# Patient Record
Sex: MALE | Race: WHITE | NOT HISPANIC OR LATINO | Employment: FULL TIME | URBAN - NONMETROPOLITAN AREA
[De-identification: names, ages, dates, MRNs, and addresses within clinical notes are randomized per-mention and may not be internally consistent; named-entity substitution may affect disease eponyms.]

---

## 2022-05-03 ENCOUNTER — OFFICE VISIT (OUTPATIENT)
Dept: FAMILY MEDICINE CLINIC | Facility: CLINIC | Age: 22
End: 2022-05-03

## 2022-05-03 VITALS
HEART RATE: 90 BPM | BODY MASS INDEX: 26.49 KG/M2 | HEIGHT: 73 IN | DIASTOLIC BLOOD PRESSURE: 76 MMHG | RESPIRATION RATE: 15 BRPM | TEMPERATURE: 98.7 F | SYSTOLIC BLOOD PRESSURE: 120 MMHG | OXYGEN SATURATION: 99 % | WEIGHT: 199.9 LBS

## 2022-05-03 DIAGNOSIS — J02.0 STREP PHARYNGITIS: Primary | ICD-10-CM

## 2022-05-03 LAB
EXPIRATION DATE: ABNORMAL
INTERNAL CONTROL: ABNORMAL
Lab: ABNORMAL
S PYO AG THROAT QL: POSITIVE

## 2022-05-03 PROCEDURE — 99213 OFFICE O/P EST LOW 20 MIN: CPT | Performed by: PHYSICIAN ASSISTANT

## 2022-05-03 PROCEDURE — 87880 STREP A ASSAY W/OPTIC: CPT | Performed by: PHYSICIAN ASSISTANT

## 2022-05-03 RX ORDER — CEPHALEXIN 500 MG/1
1000 CAPSULE ORAL 2 TIMES DAILY
Qty: 40 CAPSULE | Refills: 0 | Status: SHIPPED | OUTPATIENT
Start: 2022-05-03 | End: 2022-10-15

## 2022-05-03 NOTE — PROGRESS NOTES
"      Patient Office Visit      Patient Name: Evaristo King  : 2000   MRN: [id number]     Chief Complaint:    Chief Complaint   Patient presents with   • Sore Throat     X 3 days       History of Present Illness: Evaristo King is a 21 y.o. male who is here today for a very sore throat x3 days.  He denies any fever, chills, nasal drainage or cough.    Subjective      Review of Systems:   Review of Systems   Constitutional: Negative for chills and fever.   HENT: Positive for sore throat. Negative for congestion, postnasal drip, rhinorrhea and sneezing.    Respiratory: Negative for cough and shortness of breath.    Cardiovascular: Negative for chest pain.        Past Medical History: History reviewed. No pertinent past medical history.    Past Surgical History: History reviewed. No pertinent surgical history.    Family History: History reviewed. No pertinent family history.    Social History:   Social History     Socioeconomic History   • Marital status: Single   Tobacco Use   • Smoking status: Never Smoker   • Smokeless tobacco: Never Used   Vaping Use   • Vaping Use: Never used   Substance and Sexual Activity   • Alcohol use: Defer   • Drug use: Defer   • Sexual activity: Defer       Allergies:   No Known Allergies    Objective     Physical Exam:  Vital Signs:   Vitals:    22 1045   BP: 120/76   BP Location: Left arm   Patient Position: Sitting   Cuff Size: Adult   Pulse: 90   Resp: 15   Temp: 98.7 °F (37.1 °C)   TempSrc: Temporal   SpO2: 99%   Weight: 90.7 kg (199 lb 14.4 oz)   Height: 185.4 cm (73\")     Body mass index is 26.37 kg/m².          Physical Exam  Constitutional:       Appearance: Normal appearance.   HENT:      Right Ear: Tympanic membrane, ear canal and external ear normal.      Left Ear: Tympanic membrane, ear canal and external ear normal.      Nose: No congestion or rhinorrhea.      Mouth/Throat:      Pharynx: Oropharyngeal exudate and posterior oropharyngeal erythema present. "   Pulmonary:      Breath sounds: No rales.   Lymphadenopathy:      Cervical: Cervical adenopathy present.   Neurological:      Mental Status: He is alert.         Procedures    Assessment / Plan      Assessment/Plan:   Diagnoses and all orders for this visit:    1. Strep pharyngitis (Primary)  -     POC Rapid Strep A  -     cephalexin (Keflex) 500 MG capsule; Take 2 capsules by mouth 2 (Two) Times a Day.  Dispense: 40 capsule; Refill: 0    Positive rapid strep.  Prescribed Keflex to take 1000 mg twice daily x10 days.  Instructed patient to take all of his antibiotic.    Medications:     Current Outpatient Medications:   •  cephalexin (Keflex) 500 MG capsule, Take 2 capsules by mouth 2 (Two) Times a Day., Disp: 40 capsule, Rfl: 0        Follow Up:   Return if symptoms worsen or fail to improve.    Dayanara Jarquin PA-C   [facility]

## 2022-10-15 ENCOUNTER — OFFICE VISIT (OUTPATIENT)
Dept: FAMILY MEDICINE CLINIC | Facility: CLINIC | Age: 22
End: 2022-10-15

## 2022-10-15 VITALS — OXYGEN SATURATION: 98 % | HEART RATE: 77 BPM

## 2022-10-15 DIAGNOSIS — B34.9 VIRAL ILLNESS: ICD-10-CM

## 2022-10-15 DIAGNOSIS — R19.7 DIARRHEA OF PRESUMED INFECTIOUS ORIGIN: ICD-10-CM

## 2022-10-15 DIAGNOSIS — J40 BRONCHITIS: Primary | ICD-10-CM

## 2022-10-15 LAB
EXPIRATION DATE: NORMAL
FLUAV AG UPPER RESP QL IA.RAPID: NOT DETECTED
FLUBV AG UPPER RESP QL IA.RAPID: NOT DETECTED
INTERNAL CONTROL: NORMAL
Lab: NORMAL
SARS-COV-2 AG UPPER RESP QL IA.RAPID: NOT DETECTED

## 2022-10-15 PROCEDURE — 99214 OFFICE O/P EST MOD 30 MIN: CPT | Performed by: FAMILY MEDICINE

## 2022-10-15 PROCEDURE — 87428 SARSCOV & INF VIR A&B AG IA: CPT | Performed by: FAMILY MEDICINE

## 2022-10-15 RX ORDER — AMOXICILLIN 500 MG/1
1000 CAPSULE ORAL 2 TIMES DAILY
Qty: 14 CAPSULE | Refills: 0 | Status: SHIPPED | OUTPATIENT
Start: 2022-10-15 | End: 2023-02-14

## 2022-10-15 NOTE — PROGRESS NOTES
Telehealth E-Visit      Date: 10/15/2022   Patient Name: Evaristo King  : 2000   MRN: [id number]     Chief Complaint:    Chief Complaint   Patient presents with   • Diarrhea   • Sore Throat   • Nasal Congestion     ONSET 2 DAY      Telehealth Visit completed via MY CHART for video conferencing, patient here for telehealth visit. Patient understands the limitations of the visit with telehealth given limitations in the exam findings but patient is agreeable to this telemedicine visit due to concerns with COVID 19 exposure if patient were to be present at the office at this time    Patient location: [facility]  Provider location: [facility]  Patient has chosen to receive care through telemedicine the patient does give consent to use video audio for medical care today.    I have reviewed the E-Visit questionnaire and the patient's answers, my assessment and plan are listed below.     History of Present Illness: Evaristo King is a 21 y.o. male who is here stating that he has had 3 days of congestion drainage cough he recently took his daughter to the hospital when she was diagnosed with a bronchitis similar upper respiratory infection.  Over the last day he is also developed watery diarrhea.  No abdominal pain no nausea vomiting.  No fevers chills.  Has been treating conservatively over-the-counter.      Subjective      Review of Systems:   Review of Systems   Constitutional: Negative.    HENT: Positive for postnasal drip, rhinorrhea, sinus pressure, sneezing and sore throat.    Eyes: Negative.    Respiratory: Negative.    Cardiovascular: Negative.    Gastrointestinal: Positive for diarrhea.   Endocrine: Negative.    Genitourinary: Negative.    Musculoskeletal: Negative.    Skin: Negative.    Allergic/Immunologic: Negative.    Neurological: Negative.    Hematological: Negative.    Psychiatric/Behavioral: Negative.        I have reviewed and the following portions of  the patient's history were updated as appropriate: past family history, past medical history, past social history, past surgical history and problem list.    Medications:     Current Outpatient Medications:   •  amoxicillin (AMOXIL) 500 MG capsule, Take 2 capsules by mouth 2 (Two) Times a Day., Disp: 14 capsule, Rfl: 0    Allergies:   Allergies   Allergen Reactions   • Acorus Other (See Comments)     Wheezing   • Other Hives     Outdoors, fresh cut grass       Objective     Physical Exam:  Vital Signs:   Vitals:    10/15/22 1103   Pulse: 77   SpO2: 98%     There is no height or weight on file to calculate BMI.    Physical Exam  Vitals reviewed: Exam is done and limited to telemedicine due to COVID.           Assessment / Plan      Assessment/Plan:   Diagnoses and all orders for this visit:    1. Bronchitis (Primary)  Negative rapid COVID and flu test.  For now patient states that since his daughter had similar symptoms and she had acute bronchitis he is concerned especially with it being the weekend would like to see if we can go ahead and call in an antibiotic for him to keep.  We will go ahead and do oral amoxicillin for suspected possible bronchitis I have recommended to hold off on this medication as at this point it sounds like it is still likely viral and with his diarrhea related symptoms that it could make GI symptoms worse he voices full understanding    2. Viral illness  As above    3.  Diarrhea of presumed infectious origin  Suspect infectious viral diarrhea stay hydrated of course of any severe abdominal pain go to the ER    MEDICATION SIDE EFFECTS, RISKS AND SIDE EFFECTS HAVE BEEN DISCUSSED WITH PATIENT. PATIENT NOTES UNDERSTANDING. IF ANY CONCERN OR QUESTION REGARDING MEDICATION PLEASE CONTACT.     Follow Up:   No follow-ups on file.    MEDICATION SIDE EFFECTS, RISKS AND SIDE EFFECTS HAVE BEEN DISCUSSED WITH PATIENT. PATIENT NOTES UNDERSTANDING. IF ANY CONCERN OR QUESTION REGARDING MEDICATION PLEASE  CONTACT.     35 minutes were spent reviewing the patient's questionnaire, formulating a treatment plan, and relaying information to the patient via MyChart.    Annia Casillas DO  [facility]   10/15/22  11:04 EDT

## 2022-12-08 ENCOUNTER — TELEMEDICINE (OUTPATIENT)
Dept: FAMILY MEDICINE CLINIC | Facility: CLINIC | Age: 22
End: 2022-12-08

## 2022-12-08 DIAGNOSIS — Z23 ENCOUNTER FOR IMMUNIZATION: Primary | ICD-10-CM

## 2022-12-10 PROBLEM — Z23 ENCOUNTER FOR IMMUNIZATION: Status: ACTIVE | Noted: 2022-12-10

## 2022-12-10 NOTE — PROGRESS NOTES
"Chief Complaint  Immunizations (/)    Subjective        Evaristo King presents to [facility]  History of Present Illness  Patient did not need tetanus shot so visit was canceled immunizations up-to-date      Objective   Vital Signs:  There were no vitals taken for this visit.  Estimated body mass index is 26.37 kg/m² as calculated from the following:    Height as of 5/3/22: 185.4 cm (73\").    Weight as of 5/3/22: 90.7 kg (199 lb 14.4 oz).          Physical Exam   Result Review :                Assessment and Plan   Diagnoses and all orders for this visit:    1. Encounter for immunization (Primary)             Follow Up   No follow-ups on file.  Patient was given instructions and counseling regarding his condition or for health maintenance advice. Please see specific information pulled into the AVS if appropriate.       "

## 2023-02-14 ENCOUNTER — OFFICE VISIT (OUTPATIENT)
Dept: FAMILY MEDICINE CLINIC | Facility: CLINIC | Age: 23
End: 2023-02-14
Payer: COMMERCIAL

## 2023-02-14 VITALS
DIASTOLIC BLOOD PRESSURE: 80 MMHG | TEMPERATURE: 97.8 F | HEIGHT: 72 IN | HEART RATE: 88 BPM | OXYGEN SATURATION: 98 % | SYSTOLIC BLOOD PRESSURE: 122 MMHG | BODY MASS INDEX: 27.24 KG/M2 | WEIGHT: 201.14 LBS

## 2023-02-14 DIAGNOSIS — K52.9 GASTROENTERITIS: Primary | ICD-10-CM

## 2023-02-14 PROCEDURE — 99213 OFFICE O/P EST LOW 20 MIN: CPT | Performed by: FAMILY MEDICINE

## 2023-02-14 RX ORDER — ONDANSETRON 8 MG/1
8 TABLET, ORALLY DISINTEGRATING ORAL EVERY 8 HOURS PRN
Qty: 30 TABLET | Refills: 0 | Status: SHIPPED | OUTPATIENT
Start: 2023-02-14

## 2023-02-14 RX ORDER — OMEPRAZOLE 40 MG/1
40 CAPSULE, DELAYED RELEASE ORAL DAILY
Qty: 30 CAPSULE | Refills: 0 | Status: SHIPPED | OUTPATIENT
Start: 2023-02-14

## 2023-02-14 NOTE — ASSESSMENT & PLAN NOTE
This is resolving and self-limiting.  I do suspect he has some gastritis from recent GI illness so he will start a 2-week course of omeprazole 40 mg daily.  He was also instructed to obtain an over-the-counter probiotic and begin that for the next 4 weeks.  I did send in some Zofran as well in case he should have more nausea or vomiting.  He will call if symptoms are persistent or worsening

## 2023-02-14 NOTE — PROGRESS NOTES
Date: 2023   Patient Name: Evaristo King  : 2000   MRN: [id number]     Chief Complaint:    Chief Complaint   Patient presents with   • Abdominal Pain     Abdominal pain since Saturday, reports a burning sensation, nausea, vomiting, diarrhea        History of Present Illness: Evaristo King is a 22 y.o. male who is here today for Abdominal pain.  He reports he started having nausea, vomiting, and diarrhea on Saturday that was sudden onset.  Symptoms have improved over the last 48 hours and his last episode of vomiting or diarrhea was yesterday evening after he ate a greasy meal.  He states he is still having a burning sensation in the epigastric area of his stomach and still feeling nauseous at times.  He has not eaten today but has been drinking Gatorade and water which she has tolerated well.  He has not been around anyone that has been sick that he knows of but he was in the ED with his girlfriend's daughter late last week.  He denies blood in the stool.           Review of Systems:   Review of Systems   Constitutional: Positive for appetite change and chills. Negative for fatigue and fever.   Eyes: Negative for blurred vision.   Respiratory: Negative for cough, chest tightness and shortness of breath.    Cardiovascular: Negative for chest pain, palpitations and leg swelling.   Gastrointestinal: Positive for diarrhea, nausea, vomiting and GERD. Negative for abdominal pain and constipation.   Neurological: Negative for dizziness, tremors and headache.   Psychiatric/Behavioral: Negative for depressed mood. The patient is not nervous/anxious.        Past Medical History: History reviewed. No pertinent past medical history.    Past Surgical History: History reviewed. No pertinent surgical history.    Family History:   Family History   Problem Relation Age of Onset   • Polycystic kidney disease Maternal Grandfather        Social History:   Social History     Socioeconomic History   • Marital  "status: Single   Tobacco Use   • Smoking status: Never   • Smokeless tobacco: Never   Vaping Use   • Vaping Use: Never used   Substance and Sexual Activity   • Alcohol use: Defer   • Drug use: Defer   • Sexual activity: Defer       Medications:     Current Outpatient Medications:   •  omeprazole (priLOSEC) 40 MG capsule, Take 1 capsule by mouth Daily., Disp: 30 capsule, Rfl: 0  •  ondansetron ODT (ZOFRAN-ODT) 8 MG disintegrating tablet, Place 1 tablet on the tongue Every 8 (Eight) Hours As Needed for Nausea or Vomiting., Disp: 30 tablet, Rfl: 0    Allergies:   Allergies   Allergen Reactions   • Acorus Other (See Comments)     Wheezing   • Other Hives     Outdoors, fresh cut grass         Physical Exam:  Vital Signs:   Vitals:    02/14/23 1413   BP: 122/80   BP Location: Right arm   Patient Position: Sitting   Cuff Size: Adult   Pulse: 88   Temp: 97.8 °F (36.6 °C)   TempSrc: Temporal   SpO2: 98%   Weight: 91.2 kg (201 lb 2.2 oz)   Height: 182.9 cm (72\")     Body mass index is 27.28 kg/m².     Physical Exam  Vitals and nursing note reviewed.   Constitutional:       Appearance: Normal appearance.   Cardiovascular:      Rate and Rhythm: Normal rate and regular rhythm.      Heart sounds: Normal heart sounds. No murmur heard.  Pulmonary:      Effort: Pulmonary effort is normal.      Breath sounds: Normal breath sounds. No wheezing.   Abdominal:      General: Bowel sounds are normal.      Palpations: Abdomen is soft.      Comments: Mild tenderness to palpation in the epigastric region   Neurological:      Mental Status: He is alert and oriented to person, place, and time. Mental status is at baseline.   Psychiatric:         Mood and Affect: Mood normal.         Behavior: Behavior normal.           Assessment/Plan:   Diagnoses and all orders for this visit:    1. Gastroenteritis (Primary)  Assessment & Plan:  This is resolving and self-limiting.  I do suspect he has some gastritis from recent GI illness so he will start a " 2-week course of omeprazole 40 mg daily.  He was also instructed to obtain an over-the-counter probiotic and begin that for the next 4 weeks.  I did send in some Zofran as well in case he should have more nausea or vomiting.  He will call if symptoms are persistent or worsening    Orders:  -     omeprazole (priLOSEC) 40 MG capsule; Take 1 capsule by mouth Daily.  Dispense: 30 capsule; Refill: 0  -     ondansetron ODT (ZOFRAN-ODT) 8 MG disintegrating tablet; Place 1 tablet on the tongue Every 8 (Eight) Hours As Needed for Nausea or Vomiting.  Dispense: 30 tablet; Refill: 0         Follow Up:   Return if symptoms worsen or fail to improve.    Laura Montes, DO  [facility]

## 2023-04-18 ENCOUNTER — OFFICE VISIT (OUTPATIENT)
Dept: FAMILY MEDICINE CLINIC | Facility: CLINIC | Age: 23
End: 2023-04-18
Payer: COMMERCIAL

## 2023-04-18 VITALS
WEIGHT: 199.8 LBS | OXYGEN SATURATION: 97 % | HEART RATE: 88 BPM | SYSTOLIC BLOOD PRESSURE: 136 MMHG | BODY MASS INDEX: 27.06 KG/M2 | HEIGHT: 72 IN | DIASTOLIC BLOOD PRESSURE: 70 MMHG

## 2023-04-18 DIAGNOSIS — K52.9 GASTROENTERITIS: Primary | ICD-10-CM

## 2023-04-18 DIAGNOSIS — S39.011A STRAIN OF ABDOMINAL MUSCLE, INITIAL ENCOUNTER: ICD-10-CM

## 2023-04-18 PROBLEM — R10.30 LOWER ABDOMINAL PAIN: Status: RESOLVED | Noted: 2023-04-18 | Resolved: 2023-04-18

## 2023-04-18 PROBLEM — R10.30 LOWER ABDOMINAL PAIN: Status: ACTIVE | Noted: 2023-04-18

## 2023-04-18 RX ORDER — OMEPRAZOLE 40 MG/1
40 CAPSULE, DELAYED RELEASE ORAL DAILY
Qty: 30 CAPSULE | Refills: 3 | Status: SHIPPED | OUTPATIENT
Start: 2023-04-18

## 2023-04-18 NOTE — ASSESSMENT & PLAN NOTE
Advised patient this is likely secondary to vomiting.  Recommended heating pad, rest and light stretching.

## 2023-04-18 NOTE — ASSESSMENT & PLAN NOTE
Advised patient to start a bland diet, restart omeprazole and rest.  Increase fluids as tolerated.

## 2023-04-18 NOTE — PROGRESS NOTES
"Chief Complaint  Abdominal Pain    Subjective        Evaristo King presents to [facility]  History of Present Illness  Patient reports today secondary to having nausea, vomiting 4 days ago after drinking alcohol.  Patient states he vomited \"a lot\" meaning numerous times and had abdominal and bilateral side pain afterwards.  Patient states the next day he did have some nausea however no vomiting.  Patient reports that yesterday he lifted a heavy item at the store and had some lower abdominal pain.  Patient reports for dinner he had fried chicken and hot potato chips states he vomited and had nausea.  Patient reports no fever or chills.  Patient denies any blood in his vomit denies any diarrhea.  Patient reports he is feeling better today however has some discomfort to his sides with certain movements  Abdominal Pain        Objective   Vital Signs:  /70 (BP Location: Right arm, Patient Position: Sitting, Cuff Size: Adult)   Pulse 88   Ht 182.9 cm (72\")   Wt 90.6 kg (199 lb 12.8 oz)   SpO2 97%   BMI 27.10 kg/m²   Estimated body mass index is 27.1 kg/m² as calculated from the following:    Height as of this encounter: 182.9 cm (72\").    Weight as of this encounter: 90.6 kg (199 lb 12.8 oz).             Physical Exam  Vitals and nursing note reviewed.   Constitutional:       General: He is not in acute distress.     Appearance: He is not ill-appearing.   Cardiovascular:      Rate and Rhythm: Normal rate and regular rhythm.      Pulses: Normal pulses.   Pulmonary:      Effort: Pulmonary effort is normal. No respiratory distress.   Abdominal:      General: Bowel sounds are increased. There is no distension.      Palpations: Abdomen is soft.      Tenderness: There is abdominal tenderness. There is no guarding or rebound.      Comments: Patient mild tenderness upon palpation to bilateral mid axillary line around to lower left and lower right quadrant of abdomen   Musculoskeletal:   "       General: Normal range of motion.   Skin:     General: Skin is warm and dry.   Psychiatric:         Mood and Affect: Mood normal.        Result Review :                   Assessment and Plan   Diagnoses and all orders for this visit:    1. Gastroenteritis (Primary)  Assessment & Plan:  Advised patient to start a bland diet, restart omeprazole and rest.  Increase fluids as tolerated.      Orders:  -     omeprazole (priLOSEC) 40 MG capsule; Take 1 capsule by mouth Daily.  Dispense: 30 capsule; Refill: 3    2. Strain of abdominal muscle, initial encounter  Assessment & Plan:  Advised patient this is likely secondary to vomiting.  Recommended heating pad, rest and light stretching.             Follow Up   Return if symptoms worsen or fail to improve.  Patient was given instructions and counseling regarding his condition or for health maintenance advice. Please see specific information pulled into the AVS if appropriate.

## 2023-04-18 NOTE — LETTER
April 18, 2023     Patient: Evaristo King   YOB: 2000   Date of Visit: 4/18/2023       To Whom It May Concern:    It is my medical opinion that Evaristo King may return to work in two days.         Sincerely,        Melissa Sears PA-C    CC: No Recipients

## 2023-07-25 ENCOUNTER — OFFICE VISIT (OUTPATIENT)
Dept: FAMILY MEDICINE CLINIC | Facility: CLINIC | Age: 23
End: 2023-07-25
Payer: COMMERCIAL

## 2023-07-25 VITALS
DIASTOLIC BLOOD PRESSURE: 80 MMHG | BODY MASS INDEX: 28.34 KG/M2 | SYSTOLIC BLOOD PRESSURE: 120 MMHG | HEIGHT: 73 IN | OXYGEN SATURATION: 99 % | WEIGHT: 213.8 LBS | HEART RATE: 84 BPM

## 2023-07-25 DIAGNOSIS — K21.9 GASTROESOPHAGEAL REFLUX DISEASE, UNSPECIFIED WHETHER ESOPHAGITIS PRESENT: ICD-10-CM

## 2023-07-25 DIAGNOSIS — F41.1 GENERALIZED ANXIETY DISORDER: ICD-10-CM

## 2023-07-25 DIAGNOSIS — E04.9 GOITER: Primary | ICD-10-CM

## 2023-07-25 PROCEDURE — 99214 OFFICE O/P EST MOD 30 MIN: CPT | Performed by: PHYSICIAN ASSISTANT

## 2023-07-25 RX ORDER — HYDROXYZINE HYDROCHLORIDE 25 MG/1
TABLET, FILM COATED ORAL
Qty: 90 TABLET | Refills: 1 | Status: SHIPPED | OUTPATIENT
Start: 2023-07-25

## 2023-07-25 RX ORDER — OMEPRAZOLE 40 MG/1
40 CAPSULE, DELAYED RELEASE ORAL DAILY
Qty: 30 CAPSULE | Refills: 3 | Status: SHIPPED | OUTPATIENT
Start: 2023-07-25

## 2023-07-25 NOTE — PROGRESS NOTES
"Chief Complaint  knot in throat (X4 days)    Subjective          Evaristo King presents to [facility]  History of Present Illness patient reports that he has a sensation that he has a \"knot in his throat\" for the past 4 days.  He says it is uncomfortable when he swallows but he's not choking or having trouble swallowing, and he feels like maybe he is not getting enough air in his lungs but he does have a history of anxiety especially when he thinks there is something medically wrong and there is some question as to whether or not some of his symptoms are related to his anxiety.  He previously did have some GERD symptoms and was put on omeprazole but he stopped taking it when those symptoms improved.  Is not had sore throat and has not had a lot of drainage or postnasal drip.     Objective   Vital Signs:   /80 (BP Location: Right arm, Patient Position: Sitting, Cuff Size: Large Adult)   Pulse 84   Ht 185.4 cm (73\")   Wt 97 kg (213 lb 12.8 oz)   SpO2 99%   BMI 28.21 kg/m²     Body mass index is 28.21 kg/m².    Review of Systems   Constitutional:  Negative for appetite change, fatigue, unexpected weight gain and unexpected weight loss.   HENT:  Negative for congestion, postnasal drip, sore throat, swollen glands and trouble swallowing.    Eyes:  Negative for blurred vision.   Respiratory:  Negative for cough, chest tightness and shortness of breath.    Cardiovascular:  Negative for chest pain, palpitations and leg swelling.   Gastrointestinal:  Negative for abdominal pain, constipation, diarrhea, nausea and vomiting.   Neurological:  Negative for dizziness, tremors and headache.   Psychiatric/Behavioral:  Negative for depressed mood. The patient is not nervous/anxious.      Past History:  Medical History: has no past medical history on file.   Surgical History: has no past surgical history on file.   Family History: family history includes Asthma in his mother; Polycystic " kidney disease in his maternal grandfather.   Social History: reports that he has never smoked. He has never used smokeless tobacco. He reports that he does not currently use alcohol. He reports that he does not use drugs.      Current Outpatient Medications:     omeprazole (priLOSEC) 40 MG capsule, Take 1 capsule by mouth Daily., Disp: 30 capsule, Rfl: 3    hydrOXYzine (ATARAX) 25 MG tablet, Take 1 or 2 as needed for anxiety, Disp: 90 tablet, Rfl: 1    Allergies: Acorus and Other    Physical Exam  HENT:      Nose: Nose normal.      Mouth/Throat:      Mouth: Mucous membranes are moist.   Neck:      Thyroid: Thyromegaly present. No thyroid mass or thyroid tenderness.      Comments: Diffuse goiter  Cardiovascular:      Rate and Rhythm: Normal rate and regular rhythm.      Heart sounds: Normal heart sounds.   Pulmonary:      Effort: Pulmonary effort is normal. No respiratory distress.      Breath sounds: Normal breath sounds. No wheezing or rhonchi.   Musculoskeletal:      Cervical back: Normal range of motion and neck supple. No rigidity or tenderness. No pain with movement. Normal range of motion.   Lymphadenopathy:      Cervical: No cervical adenopathy.   Neurological:      General: No focal deficit present.      Mental Status: He is alert and oriented to person, place, and time.   Psychiatric:         Mood and Affect: Mood normal.         Behavior: Behavior normal.        Result Review :                   Assessment and Plan    Diagnoses and all orders for this visit:    1. Goiter (Primary)  Assessment & Plan:  I am going to get a thyroid ultrasound to look at this further.  I will also get some routine screening blood work done and have him follow-up in a couple of weeks to discuss the results.  Reassured him and told him I did not think there is anything serious going on here but these test should give us some answers.    Orders:  -     TSH; Future  -     T4, free; Future  -     US Thyroid; Future  -     TSH  -      T4, free    2. Gastroesophageal reflux disease, unspecified whether esophagitis present  Assessment & Plan:  I suggested we resume the Omeprazole as this sensation he feels could be related to reflux.     Orders:  -     omeprazole (priLOSEC) 40 MG capsule; Take 1 capsule by mouth Daily.  Dispense: 30 capsule; Refill: 3  -     CBC & Differential; Future  -     Comprehensive Metabolic Panel; Future  -     CBC & Differential  -     Comprehensive Metabolic Panel    3. Generalized anxiety disorder  Assessment & Plan:  I have suggested that we give him some hydroxyzine to take if he has panic feelings between now and when we follow-up.      Other orders  -     hydrOXYzine (ATARAX) 25 MG tablet; Take 1 or 2 as needed for anxiety  Dispense: 90 tablet; Refill: 1        Follow Up   Return in about 2 weeks (around 8/8/2023) for Recheck.  Patient was given instructions and counseling regarding his condition or for health maintenance advice. Please see specific information pulled into the AVS if appropriate.     Amee Bradshaw PA-C

## 2023-07-25 NOTE — ASSESSMENT & PLAN NOTE
I have suggested that we give him some hydroxyzine to take if he has panic feelings between now and when we follow-up.

## 2023-07-25 NOTE — ASSESSMENT & PLAN NOTE
I am going to get a thyroid ultrasound to look at this further.  I will also get some routine screening blood work done and have him follow-up in a couple of weeks to discuss the results.  Reassured him and told him I did not think there is anything serious going on here but these test should give us some answers.

## 2023-07-26 LAB
ALBUMIN SERPL-MCNC: 5 G/DL (ref 4.3–5.2)
ALBUMIN/GLOB SERPL: 2.2 {RATIO} (ref 1.2–2.2)
ALP SERPL-CCNC: 83 IU/L (ref 44–121)
ALT SERPL-CCNC: 25 IU/L (ref 0–44)
AST SERPL-CCNC: 21 IU/L (ref 0–40)
BASOPHILS # BLD AUTO: 0 X10E3/UL (ref 0–0.2)
BASOPHILS NFR BLD AUTO: 1 %
BILIRUB SERPL-MCNC: 0.6 MG/DL (ref 0–1.2)
BUN SERPL-MCNC: 12 MG/DL (ref 6–20)
BUN/CREAT SERPL: 11 (ref 9–20)
CALCIUM SERPL-MCNC: 9.9 MG/DL (ref 8.7–10.2)
CHLORIDE SERPL-SCNC: 103 MMOL/L (ref 96–106)
CO2 SERPL-SCNC: 23 MMOL/L (ref 20–29)
CREAT SERPL-MCNC: 1.12 MG/DL (ref 0.76–1.27)
EGFRCR SERPLBLD CKD-EPI 2021: 95 ML/MIN/1.73
EOSINOPHIL # BLD AUTO: 0.3 X10E3/UL (ref 0–0.4)
EOSINOPHIL NFR BLD AUTO: 4 %
ERYTHROCYTE [DISTWIDTH] IN BLOOD BY AUTOMATED COUNT: 12.4 % (ref 11.6–15.4)
GLOBULIN SER CALC-MCNC: 2.3 G/DL (ref 1.5–4.5)
GLUCOSE SERPL-MCNC: 92 MG/DL (ref 70–99)
HCT VFR BLD AUTO: 48 % (ref 37.5–51)
HGB BLD-MCNC: 15.7 G/DL (ref 13–17.7)
IMM GRANULOCYTES # BLD AUTO: 0 X10E3/UL (ref 0–0.1)
IMM GRANULOCYTES NFR BLD AUTO: 0 %
LYMPHOCYTES # BLD AUTO: 1.8 X10E3/UL (ref 0.7–3.1)
LYMPHOCYTES NFR BLD AUTO: 26 %
MCH RBC QN AUTO: 28.1 PG (ref 26.6–33)
MCHC RBC AUTO-ENTMCNC: 32.7 G/DL (ref 31.5–35.7)
MCV RBC AUTO: 86 FL (ref 79–97)
MONOCYTES # BLD AUTO: 0.7 X10E3/UL (ref 0.1–0.9)
MONOCYTES NFR BLD AUTO: 10 %
NEUTROPHILS # BLD AUTO: 4 X10E3/UL (ref 1.4–7)
NEUTROPHILS NFR BLD AUTO: 59 %
PLATELET # BLD AUTO: 237 X10E3/UL (ref 150–450)
POTASSIUM SERPL-SCNC: 4.4 MMOL/L (ref 3.5–5.2)
PROT SERPL-MCNC: 7.3 G/DL (ref 6–8.5)
RBC # BLD AUTO: 5.58 X10E6/UL (ref 4.14–5.8)
SODIUM SERPL-SCNC: 142 MMOL/L (ref 134–144)
T4 FREE SERPL-MCNC: 1.5 NG/DL (ref 0.82–1.77)
TSH SERPL DL<=0.005 MIU/L-ACNC: 1.01 UIU/ML (ref 0.45–4.5)
WBC # BLD AUTO: 6.7 X10E3/UL (ref 3.4–10.8)

## 2023-08-09 ENCOUNTER — OFFICE VISIT (OUTPATIENT)
Dept: FAMILY MEDICINE CLINIC | Facility: CLINIC | Age: 23
End: 2023-08-09
Payer: COMMERCIAL

## 2023-08-09 VITALS
SYSTOLIC BLOOD PRESSURE: 123 MMHG | HEIGHT: 73 IN | WEIGHT: 216.8 LBS | DIASTOLIC BLOOD PRESSURE: 70 MMHG | HEART RATE: 101 BPM | BODY MASS INDEX: 28.73 KG/M2 | TEMPERATURE: 98 F | OXYGEN SATURATION: 98 %

## 2023-08-09 DIAGNOSIS — K21.9 GASTROESOPHAGEAL REFLUX DISEASE, UNSPECIFIED WHETHER ESOPHAGITIS PRESENT: Primary | ICD-10-CM

## 2023-08-09 PROCEDURE — 99213 OFFICE O/P EST LOW 20 MIN: CPT | Performed by: FAMILY MEDICINE

## 2023-08-09 NOTE — PROGRESS NOTES
Date: 2023   Patient Name: Evaristo King  : 2000   MRN: [id number]     Chief Complaint:    Chief Complaint   Patient presents with    Results     Discuss thyroid ultrasound results        History of Present Illness: Evaristo King is a 22 y.o. male who is here today to follow up for Recent thyroid ultrasound.  He saw another provider complaining of feeling like a lump in his throat.  Thyroid ultrasound is normal.  He reports his symptoms have improved since restarting his omeprazole however have not completely resolved.           Review of Systems:   Review of Systems   Constitutional:  Negative for fatigue.   Eyes:  Negative for blurred vision.   Respiratory:  Negative for cough, chest tightness and shortness of breath.    Cardiovascular:  Negative for chest pain, palpitations and leg swelling.   Gastrointestinal:  Positive for GERD and indigestion. Negative for abdominal pain, constipation, diarrhea, nausea and vomiting.   Neurological:  Negative for dizziness, tremors and headache.   Psychiatric/Behavioral:  Negative for depressed mood. The patient is not nervous/anxious.      Past Medical History: History reviewed. No pertinent past medical history.    Past Surgical History: History reviewed. No pertinent surgical history.    Family History:   Family History   Problem Relation Age of Onset    Asthma Mother     Polycystic kidney disease Maternal Grandfather        Social History:   Social History     Socioeconomic History    Marital status: Single   Tobacco Use    Smoking status: Never    Smokeless tobacco: Never   Vaping Use    Vaping Use: Every day    Substances: Nicotine, Flavoring    Devices: Disposable, Pre-filled pod   Substance and Sexual Activity    Alcohol use: Not Currently    Drug use: Never    Sexual activity: Defer       Medications:     Current Outpatient Medications:     hydrOXYzine (ATARAX) 25 MG tablet, Take 1 or 2 as needed for anxiety, Disp: 90 tablet, Rfl: 1     "omeprazole (priLOSEC) 40 MG capsule, Take 1 capsule by mouth Daily., Disp: 30 capsule, Rfl: 3    Allergies:   Allergies   Allergen Reactions    Acorus Other (See Comments)     Wheezing    Other Hives     Outdoors, fresh cut grass       PHQ-2 Total Score:     PHQ-9 Total Score:       Physical Exam:  Vital Signs:   Vitals:    08/09/23 1334 08/09/23 1350   BP: 158/90 123/70   BP Location: Right arm    Patient Position: Sitting    Cuff Size: Adult    Pulse: 101    Temp: 98 øF (36.7 øC)    TempSrc: Temporal    SpO2: 98%    Weight: 98.3 kg (216 lb 12.8 oz)    Height: 185.4 cm (73\")      Body mass index is 28.6 kg/mý.     Physical Exam  Constitutional:       Appearance: Normal appearance.   Abdominal:      General: Abdomen is flat. Bowel sounds are normal.      Palpations: Abdomen is soft.      Comments: Mild epigastric tenderness to palpation   Neurological:      Mental Status: He is alert.         Assessment/Plan:   Diagnoses and all orders for this visit:    1. Gastroesophageal reflux disease, unspecified whether esophagitis present (Primary)  Assessment & Plan:  Patient will complete at least 4 weeks of omeprazole 40 mg daily.  He may then back down to over-the-counter 20 mg daily as long as symptoms remain well-controlled.  He was also counseled about avoiding trigger foods and discontinuation of vaping.             Follow Up:   Return in about 6 months (around 2/9/2024) for Annual physical.    Laura Montes, DO  [facility]    "

## 2023-08-09 NOTE — ASSESSMENT & PLAN NOTE
Patient will complete at least 4 weeks of omeprazole 40 mg daily.  He may then back down to over-the-counter 20 mg daily as long as symptoms remain well-controlled.  He was also counseled about avoiding trigger foods and discontinuation of vaping.

## 2024-01-03 ENCOUNTER — OFFICE VISIT (OUTPATIENT)
Dept: FAMILY MEDICINE CLINIC | Facility: CLINIC | Age: 24
End: 2024-01-03
Payer: COMMERCIAL

## 2024-01-03 VITALS
TEMPERATURE: 98.2 F | DIASTOLIC BLOOD PRESSURE: 82 MMHG | BODY MASS INDEX: 30.21 KG/M2 | SYSTOLIC BLOOD PRESSURE: 120 MMHG | HEART RATE: 70 BPM | WEIGHT: 229 LBS | OXYGEN SATURATION: 97 %

## 2024-01-03 DIAGNOSIS — J45.21 INTERMITTENT ASTHMA WITH ACUTE EXACERBATION, UNSPECIFIED ASTHMA SEVERITY: Primary | ICD-10-CM

## 2024-01-03 PROCEDURE — 99214 OFFICE O/P EST MOD 30 MIN: CPT | Performed by: PHYSICIAN ASSISTANT

## 2024-01-03 PROCEDURE — 87428 SARSCOV & INF VIR A&B AG IA: CPT | Performed by: PHYSICIAN ASSISTANT

## 2024-01-03 RX ORDER — ALBUTEROL SULFATE 90 UG/1
2 AEROSOL, METERED RESPIRATORY (INHALATION) EVERY 4 HOURS PRN
Qty: 10.8 G | Refills: 0 | Status: SHIPPED | OUTPATIENT
Start: 2024-01-03

## 2024-01-03 RX ORDER — ALBUTEROL SULFATE 90 UG/1
AEROSOL, METERED RESPIRATORY (INHALATION)
COMMUNITY
Start: 2023-12-30 | End: 2024-01-03

## 2024-01-03 RX ORDER — PREDNISONE 20 MG/1
TABLET ORAL
Qty: 18 TABLET | Refills: 0 | Status: SHIPPED | OUTPATIENT
Start: 2024-01-03 | End: 2024-01-12

## 2024-01-03 RX ORDER — AZITHROMYCIN 250 MG/1
TABLET, FILM COATED ORAL
Qty: 6 TABLET | Refills: 0 | Status: SHIPPED | OUTPATIENT
Start: 2024-01-03

## 2024-01-03 RX ORDER — DEXTROMETHORPHAN HYDROBROMIDE AND PROMETHAZINE HYDROCHLORIDE 15; 6.25 MG/5ML; MG/5ML
SYRUP ORAL
COMMUNITY
Start: 2023-12-30

## 2024-01-03 NOTE — PROGRESS NOTES
Patient Office Visit      Patient Name: Evaristo King  : 2000   MRN: [id number]     Chief Complaint:    Chief Complaint   Patient presents with    Shortness of Breath    Cough     Worse at night       History of Present Illness: Evaristo King is a 23 y.o. male who is here today complaining of cough and difficulty breathing.  Remote history of asthma.  Symptoms started about 3 weeks ago.  He has been to urgent care twice and been prescribed albuterol inhaler and Promethazine DM.  Trouble breathing especially at night.  Denies any head cold symptoms.    Subjective      Review of Systems:   Review of Systems   Constitutional:  Negative for chills and fever.   HENT:  Negative for congestion, postnasal drip, sinus pressure and sore throat.    Respiratory:  Positive for cough, shortness of breath and wheezing.         Past Medical History: No past medical history on file.    Past Surgical History: No past surgical history on file.    Family History:   Family History   Problem Relation Age of Onset    Asthma Mother     Polycystic kidney disease Maternal Grandfather        Social History:   Social History     Socioeconomic History    Marital status: Single   Tobacco Use    Smoking status: Never    Smokeless tobacco: Never   Vaping Use    Vaping Use: Every day    Substances: Nicotine, Flavoring    Devices: Disposable, Pre-filled pod   Substance and Sexual Activity    Alcohol use: Not Currently    Drug use: Never    Sexual activity: Defer       Allergies:   Allergies   Allergen Reactions    Acorus Other (See Comments)     Wheezing    Other Hives     Outdoors, fresh cut grass       Objective     Physical Exam:  Vital Signs:   Vitals:    24 1411   BP: 120/82   BP Location: Left arm   Patient Position: Sitting   Cuff Size: Adult   Pulse: 70   Temp: 98.2 °F (36.8 °C)   TempSrc: Oral   SpO2: 97%   Weight: 104 kg (229 lb)     Body mass index is 30.21 kg/m².        Physical Exam  Constitutional:        Appearance: Normal appearance.   Pulmonary:      Breath sounds: Wheezing and rhonchi present.   Neurological:      Mental Status: He is alert.         Procedures    Assessment / Plan      Assessment/Plan:   Diagnoses and all orders for this visit:    1. Intermittent asthma with acute exacerbation, unspecified asthma severity (Primary)  -     POCT SARS-CoV-2 Antigen DAYANARA + Flu  -     predniSONE (DELTASONE) 20 MG tablet; Take 3 tablets by mouth Daily for 3 days, THEN 2 tablets Daily for 3 days, THEN 1 tablet Daily for 3 days.  Dispense: 18 tablet; Refill: 0  -     albuterol sulfate  (90 Base) MCG/ACT inhaler; Inhale 2 puffs Every 4 (Four) Hours As Needed for Wheezing.  Dispense: 10.8 g; Refill: 0  -     azithromycin (ZITHROMAX) 250 MG tablet; 2 tablets by oral route day 1 then 1 tablet by oral route next 4 days  Dispense: 6 tablet; Refill: 0       Negative rapid COVID and flu.  Prescribed prednisone, Zithromax and refilled his albuterol inhaler for asthma exacerbation.  If symptoms rebound after this then patient will need to be on some type of daily controller inhaler.    Medications:     Current Outpatient Medications:     hydrOXYzine (ATARAX) 25 MG tablet, Take 1 or 2 as needed for anxiety, Disp: 90 tablet, Rfl: 1    omeprazole (priLOSEC) 40 MG capsule, Take 1 capsule by mouth Daily., Disp: 30 capsule, Rfl: 3    promethazine-dextromethorphan (PROMETHAZINE-DM) 6.25-15 MG/5ML syrup, TAKE 10 ML BY MOUTH EVERY 8 HOURS AS NEEDED FOR COUGH OR CONGESTION. MAY CAUSE DROWSINESS, Disp: , Rfl:     albuterol sulfate  (90 Base) MCG/ACT inhaler, Inhale 2 puffs Every 4 (Four) Hours As Needed for Wheezing., Disp: 10.8 g, Rfl: 0    azithromycin (ZITHROMAX) 250 MG tablet, 2 tablets by oral route day 1 then 1 tablet by oral route next 4 days, Disp: 6 tablet, Rfl: 0    predniSONE (DELTASONE) 20 MG tablet, Take 3 tablets by mouth Daily for 3 days, THEN 2 tablets Daily for 3 days, THEN 1 tablet Daily for 3 days., Disp: 18  tablet, Rfl: 0        Follow Up:   No follow-ups on file.    Dayanara Jarquin PA-C   [facility]     NOTE TO PATIENT: The 21st Century Cures Act makes medical notes like these available to patients in the interest of transparency. However, be advised this is a medical document. It is intended as peer to peer communication. It is written in medical language and may contain abbreviations or verbiage that are unfamiliar. It may appear blunt or direct. Medical documents are intended to carry relevant information, facts as evident, and the clinical opinion of the practitioner.

## 2024-01-12 ENCOUNTER — OFFICE VISIT (OUTPATIENT)
Dept: FAMILY MEDICINE CLINIC | Facility: CLINIC | Age: 24
End: 2024-01-12
Payer: COMMERCIAL

## 2024-01-12 VITALS
SYSTOLIC BLOOD PRESSURE: 142 MMHG | OXYGEN SATURATION: 98 % | RESPIRATION RATE: 15 BRPM | BODY MASS INDEX: 31.83 KG/M2 | DIASTOLIC BLOOD PRESSURE: 90 MMHG | HEART RATE: 89 BPM | WEIGHT: 235 LBS | HEIGHT: 72 IN

## 2024-01-12 DIAGNOSIS — J45.30 MILD PERSISTENT ASTHMA WITHOUT COMPLICATION: Primary | ICD-10-CM

## 2024-01-12 RX ORDER — BUDESONIDE AND FORMOTEROL FUMARATE DIHYDRATE 160; 4.5 UG/1; UG/1
2 AEROSOL RESPIRATORY (INHALATION)
Qty: 30.6 G | Refills: 3 | Status: SHIPPED | OUTPATIENT
Start: 2024-01-12

## 2024-01-12 NOTE — PROGRESS NOTES
"      Patient Office Visit      Patient Name: Evaristo King  : 2000   MRN: [id number]     Chief Complaint:    Chief Complaint   Patient presents with    Asthma       History of Present Illness: Evaristo King is a 23 y.o. male who is here today for follow-up for asthma.  Just finished his prednisone last night.  Breathing is much better.  He quit smoking back in the fall.  He does want to try a daily inhaler for asthma.    Subjective      Review of Systems:         Past Medical History: History reviewed. No pertinent past medical history.    Past Surgical History: History reviewed. No pertinent surgical history.    Family History:   Family History   Problem Relation Age of Onset    Asthma Mother     Polycystic kidney disease Maternal Grandfather        Social History:   Social History     Socioeconomic History    Marital status: Single   Tobacco Use    Smoking status: Never    Smokeless tobacco: Never   Vaping Use    Vaping Use: Every day    Substances: Nicotine, Flavoring    Devices: Disposable, Pre-filled pod   Substance and Sexual Activity    Alcohol use: Not Currently    Drug use: Never    Sexual activity: Defer       Allergies:   Allergies   Allergen Reactions    Acorus Other (See Comments)     Wheezing    Other Hives     Outdoors, fresh cut grass       Objective     Physical Exam:  Vital Signs:   Vitals:    24 1351   BP: 142/90   BP Location: Right arm   Patient Position: Sitting   Cuff Size: Adult   Pulse: 89   Resp: 15   SpO2: 98%   Weight: 107 kg (235 lb)   Height: 182.9 cm (72\")     Body mass index is 31.87 kg/m².        Physical Exam  Constitutional:       Appearance: Normal appearance.   Cardiovascular:      Rate and Rhythm: Normal rate and regular rhythm.   Pulmonary:      Effort: Pulmonary effort is normal. No respiratory distress.      Breath sounds: Wheezing (Minimal end expiratory wheezing) present. No rhonchi.   Neurological:      Mental Status: He is alert. "         Procedures    Assessment / Plan      Assessment/Plan:   Diagnoses and all orders for this visit:    1. Mild persistent asthma without complication (Primary)  -     budesonide-formoterol (SYMBICORT) 160-4.5 MCG/ACT inhaler; Inhale 2 puffs 2 (Two) Times a Day.  Dispense: 30.6 g; Refill: 3       Continue rescue inhaler as needed and start a daily controller inhaler.    Medications:     Current Outpatient Medications:     albuterol sulfate  (90 Base) MCG/ACT inhaler, Inhale 2 puffs Every 4 (Four) Hours As Needed for Wheezing., Disp: 10.8 g, Rfl: 0    hydrOXYzine (ATARAX) 25 MG tablet, Take 1 or 2 as needed for anxiety, Disp: 90 tablet, Rfl: 1    omeprazole (priLOSEC) 40 MG capsule, Take 1 capsule by mouth Daily., Disp: 30 capsule, Rfl: 3    budesonide-formoterol (SYMBICORT) 160-4.5 MCG/ACT inhaler, Inhale 2 puffs 2 (Two) Times a Day., Disp: 30.6 g, Rfl: 3        Follow Up:   No follow-ups on file.    Dayanara Jarquin PA-C   [facility] [place]     NOTE TO PATIENT: The 21st Century Cures Act makes medical notes like these available to patients in the interest of transparency. However, be advised this is a medical document. It is intended as peer to peer communication. It is written in medical language and may contain abbreviations or verbiage that are unfamiliar. It may appear blunt or direct. Medical documents are intended to carry relevant information, facts as evident, and the clinical opinion of the practitioner.

## 2024-01-16 DIAGNOSIS — J45.21 INTERMITTENT ASTHMA WITH ACUTE EXACERBATION, UNSPECIFIED ASTHMA SEVERITY: ICD-10-CM

## 2024-01-17 RX ORDER — ALBUTEROL SULFATE 90 UG/1
2 AEROSOL, METERED RESPIRATORY (INHALATION) EVERY 4 HOURS PRN
Qty: 8.5 G | OUTPATIENT
Start: 2024-01-17

## 2024-01-23 ENCOUNTER — OFFICE VISIT (OUTPATIENT)
Dept: FAMILY MEDICINE CLINIC | Facility: CLINIC | Age: 24
End: 2024-01-23
Payer: COMMERCIAL

## 2024-01-23 VITALS
DIASTOLIC BLOOD PRESSURE: 66 MMHG | HEART RATE: 102 BPM | OXYGEN SATURATION: 96 % | HEIGHT: 72 IN | BODY MASS INDEX: 31.69 KG/M2 | TEMPERATURE: 99.5 F | SYSTOLIC BLOOD PRESSURE: 114 MMHG | WEIGHT: 234 LBS

## 2024-01-23 DIAGNOSIS — J45.21 INTERMITTENT ASTHMA WITH ACUTE EXACERBATION, UNSPECIFIED ASTHMA SEVERITY: ICD-10-CM

## 2024-01-23 DIAGNOSIS — R05.9 COUGH, UNSPECIFIED TYPE: Primary | ICD-10-CM

## 2024-01-23 PROCEDURE — 93000 ELECTROCARDIOGRAM COMPLETE: CPT | Performed by: FAMILY MEDICINE

## 2024-01-23 PROCEDURE — 87428 SARSCOV & INF VIR A&B AG IA: CPT | Performed by: FAMILY MEDICINE

## 2024-01-23 PROCEDURE — 99214 OFFICE O/P EST MOD 30 MIN: CPT | Performed by: FAMILY MEDICINE

## 2024-01-23 RX ORDER — ALBUTEROL SULFATE 90 UG/1
2 AEROSOL, METERED RESPIRATORY (INHALATION) EVERY 4 HOURS PRN
Qty: 10.8 G | Refills: 1 | Status: SHIPPED | OUTPATIENT
Start: 2024-01-23

## 2024-01-23 RX ORDER — MONTELUKAST SODIUM 10 MG/1
10 TABLET ORAL NIGHTLY
Qty: 90 TABLET | Refills: 1 | Status: SHIPPED | OUTPATIENT
Start: 2024-01-23

## 2024-01-23 RX ORDER — CETIRIZINE HYDROCHLORIDE 10 MG/1
10 TABLET ORAL DAILY
Qty: 90 TABLET | Refills: 1 | Status: SHIPPED | OUTPATIENT
Start: 2024-01-23

## 2024-01-23 NOTE — PROGRESS NOTES
Follow Up Office Visit      Patient Name: Evaristo King  : 2000   MRN: [id number]     Chief Complaint:    Chief Complaint   Patient presents with    Shortness of Breath       History of Present Illness: Evaristo King is a 23 y.o. male who is here today to   be evaluated for cough shortness of breath and some wheezing.  He said he quit smoking back in the fall and then says since that point has had a little bit more troubles with his asthma he says he was diagnosed when he was about 13 and then for the past 10 years has not had to use any inhalers and then more recently here since he quit smoking he is had to use his inhaler.  He came in a month ago got some prednisone and some treatment got a little bit better but then now is worse has been using his inhaler almost 12 times a day he says just because it helps him breathe better.  He says he has troubles breathing laying down at night has had to prop himself up in the bed some.  No leg edema no chest pains says mostly coughing up clear mucus.  No fever no sore throat no earache or sinus pressure.    He has had no leg edema no history of blood clots    Subjective      Review of Systems:   Review of Systems    Past Medical History: History reviewed. No pertinent past medical history.    Past Surgical History: History reviewed. No pertinent surgical history.    Family History:   Family History   Problem Relation Age of Onset    Asthma Mother     Polycystic kidney disease Maternal Grandfather        Social History:   Social History     Socioeconomic History    Marital status: Single   Tobacco Use    Smoking status: Never    Smokeless tobacco: Never   Vaping Use    Vaping Use: Every day    Substances: Nicotine, Flavoring    Devices: Disposable, Pre-filled pod   Substance and Sexual Activity    Alcohol use: Not Currently    Drug use: Never    Sexual activity: Defer       Medications:     Current Outpatient Medications:     albuterol sulfate  (90  "Base) MCG/ACT inhaler, Inhale 2 puffs Every 4 (Four) Hours As Needed for Wheezing., Disp: 10.8 g, Rfl: 1    budesonide-formoterol (SYMBICORT) 160-4.5 MCG/ACT inhaler, Inhale 2 puffs 2 (Two) Times a Day., Disp: 30.6 g, Rfl: 3    cetirizine (zyrTEC) 10 MG tablet, Take 1 tablet by mouth Daily., Disp: 90 tablet, Rfl: 1    hydrOXYzine (ATARAX) 25 MG tablet, Take 1 or 2 as needed for anxiety, Disp: 90 tablet, Rfl: 1    montelukast (Singulair) 10 MG tablet, Take 1 tablet by mouth Every Night., Disp: 90 tablet, Rfl: 1    omeprazole (priLOSEC) 40 MG capsule, Take 1 capsule by mouth Daily., Disp: 30 capsule, Rfl: 3    Allergies:   Allergies   Allergen Reactions    Acorus Other (See Comments)     Wheezing    Other Hives     Outdoors, fresh cut grass       Objective     Physical Exam:  Vital Signs:   Vitals:    01/23/24 1143   BP: 114/66   BP Location: Right arm   Patient Position: Sitting   Cuff Size: Large Adult   Pulse: 102   Temp: 99.5 °F (37.5 °C)   TempSrc: Oral   SpO2: 96%   Weight: 106 kg (234 lb)   Height: 182.9 cm (72\")     Body mass index is 31.74 kg/m².     Physical Exam  Vitals and nursing note reviewed.   Constitutional:       Appearance: Normal appearance.   HENT:      Head: Normocephalic and atraumatic.      Right Ear: Tympanic membrane and ear canal normal.      Left Ear: Tympanic membrane and ear canal normal.      Nose: Nose normal.      Mouth/Throat:      Mouth: Mucous membranes are moist.      Pharynx: Oropharynx is clear. No posterior oropharyngeal erythema.   Eyes:      Conjunctiva/sclera: Conjunctivae normal.   Cardiovascular:      Rate and Rhythm: Normal rate and regular rhythm.      Heart sounds: Normal heart sounds.   Pulmonary:      Effort: Pulmonary effort is normal. No respiratory distress.      Breath sounds: No stridor. Wheezing present. No rhonchi or rales.      Comments: Does have some mild diffuse wheezing heard throughout  Abdominal:      Palpations: Abdomen is soft.      Tenderness: There is " no abdominal tenderness.   Musculoskeletal:         General: No swelling or tenderness.      Cervical back: Normal range of motion and neck supple. No rigidity or tenderness.      Right lower leg: No edema.      Left lower leg: No edema.   Lymphadenopathy:      Cervical: No cervical adenopathy.   Skin:     General: Skin is warm and dry.   Neurological:      Mental Status: He is alert.   Psychiatric:         Mood and Affect: Mood normal.         Behavior: Behavior normal.           ECG 12 Lead    Date/Time: 1/23/2024 5:03 PM  Performed by: Adeel Ramirez MD    Authorized by: Adeel Ramirez MD  Comparison: not compared with previous ECG   Previous ECG: no previous ECG available  Rhythm: sinus rhythm  Rate: normal  BPM: 74  Conduction: conduction normal    Clinical impression: normal ECG          PHQ-9 Total Score:       Assessment / Plan      Assessment/Plan:   Diagnoses and all orders for this visit:    1. Cough, unspecified type (Primary)  -     POCT SARS-CoV-2 Antigen DAYANARA + Flu  -     XR Chest PA & Lateral; Future    2. Intermittent asthma with acute exacerbation, unspecified asthma severity  -     albuterol sulfate  (90 Base) MCG/ACT inhaler; Inhale 2 puffs Every 4 (Four) Hours As Needed for Wheezing.  Dispense: 10.8 g; Refill: 1  -     XR Chest PA & Lateral; Future  -     cetirizine (zyrTEC) 10 MG tablet; Take 1 tablet by mouth Daily.  Dispense: 90 tablet; Refill: 1    Other orders  -     montelukast (Singulair) 10 MG tablet; Take 1 tablet by mouth Every Night.  Dispense: 90 tablet; Refill: 1    Flu and COVID swabs are negative    EKG is normal    Will get a chest x-ray to evaluate further    He relates that he has not been taking his Symbicort he did not pick that prescription up he did not know about it.  But I gave him a spacer and explained to him how to use that medicine appropriate and to rinse his mouth or brush his teeth after each use.    He is may still use albuterol in between    Getting back on  Zyrtec 10 mg once per day    Will add in Singulair 10 mg once per day and encouraged him to use the Symbicort inhaler as directed.    Call for chest x-ray report tomorrow certainly if he gets worse or short of breath get in here to the ER.    I congratulated him on quitting smoking that the best thing he can do for his health and will follow-up in about 1 to 2 months for recheck and may consider spirometry at that point and do his physical    Will need blood work next time and also      Follow Up:   Return in about 6 weeks (around 3/5/2024) for Annual physical.        Adeel Ramirez MD  [facility]   Portions of note created with Dragon voice recognition technology

## 2024-01-25 ENCOUNTER — TELEPHONE (OUTPATIENT)
Dept: FAMILY MEDICINE CLINIC | Facility: CLINIC | Age: 24
End: 2024-01-25
Payer: COMMERCIAL

## 2024-01-25 NOTE — TELEPHONE ENCOUNTER
----- Message from Adeel Ramirez MD sent at 1/24/2024 10:24 PM EST -----  Your labs/tests were normal.  Tell him     Please keep regular scheduled appointments and follow-up as directed.

## 2024-01-31 ENCOUNTER — OFFICE VISIT (OUTPATIENT)
Dept: FAMILY MEDICINE CLINIC | Facility: CLINIC | Age: 24
End: 2024-01-31
Payer: COMMERCIAL

## 2024-01-31 VITALS
WEIGHT: 232 LBS | HEIGHT: 72 IN | HEART RATE: 96 BPM | DIASTOLIC BLOOD PRESSURE: 70 MMHG | BODY MASS INDEX: 31.42 KG/M2 | SYSTOLIC BLOOD PRESSURE: 130 MMHG | OXYGEN SATURATION: 96 % | TEMPERATURE: 98.6 F

## 2024-01-31 DIAGNOSIS — J02.9 SORE THROAT: Primary | ICD-10-CM

## 2024-01-31 DIAGNOSIS — J30.9 ALLERGIC RHINITIS, UNSPECIFIED SEASONALITY, UNSPECIFIED TRIGGER: ICD-10-CM

## 2024-01-31 DIAGNOSIS — L70.0 ACNE CYSTICA: ICD-10-CM

## 2024-01-31 LAB
EXPIRATION DATE: NORMAL
INTERNAL CONTROL: NORMAL
Lab: NORMAL
S PYO AG THROAT QL: NEGATIVE

## 2024-01-31 PROCEDURE — 87880 STREP A ASSAY W/OPTIC: CPT | Performed by: FAMILY MEDICINE

## 2024-01-31 PROCEDURE — 99213 OFFICE O/P EST LOW 20 MIN: CPT | Performed by: FAMILY MEDICINE

## 2024-01-31 RX ORDER — CEPHALEXIN 500 MG/1
500 CAPSULE ORAL 3 TIMES DAILY
Qty: 30 CAPSULE | Refills: 0 | Status: SHIPPED | OUTPATIENT
Start: 2024-01-31

## 2024-01-31 NOTE — PROGRESS NOTES
Follow Up Office Visit      Patient Name: Evaristo King  : 2000   MRN: [id number]     Chief Complaint:    Chief Complaint   Patient presents with    Sore Throat       History of Present Illness: Evaristo King is a 23 y.o. male who is here today to   be evaluated for sore throat he has had for couple days little bit of drainage no cough no fever he also has little soreness in the left ear canal he said his wife Poked it with a needle trying to get some out of it but nothing came.    Review of Systems   Constitutional: Negative for fatigue and fever.   Respiratory: Negative for cough and shortness of breath.    Cardiovascular: Negative for chest pain and palpitations.   Skin: Negative for rash or itching      Subjective      Review of Systems:   Review of Systems    Past Medical History: History reviewed. No pertinent past medical history.    Past Surgical History: History reviewed. No pertinent surgical history.    Family History:   Family History   Problem Relation Age of Onset    Asthma Mother     Polycystic kidney disease Maternal Grandfather        Social History:   Social History     Socioeconomic History    Marital status: Single   Tobacco Use    Smoking status: Former     Packs/day: .25     Types: Cigarettes     Start date:      Quit date:      Years since quittin.0    Smokeless tobacco: Never   Vaping Use    Vaping Use: Former    Substances: Nicotine, Flavoring    Devices: Disposable, Pre-filled pod   Substance and Sexual Activity    Alcohol use: Not Currently    Drug use: Never    Sexual activity: Yes     Partners: Female       Medications:     Current Outpatient Medications:     albuterol sulfate  (90 Base) MCG/ACT inhaler, Inhale 2 puffs Every 4 (Four) Hours As Needed for Wheezing., Disp: 10.8 g, Rfl: 1    budesonide-formoterol (SYMBICORT) 160-4.5 MCG/ACT inhaler, Inhale 2 puffs 2 (Two) Times a Day., Disp: 30.6 g, Rfl: 3    cetirizine (zyrTEC) 10 MG tablet, Take 1 tablet  Impression: Primary open-angle glaucoma, mild stage, bilateral: H40.6031. Plan: Patient's intraocular pressure is within acceptable range and examination is unchanged. Continue current treatment. - Continue Alphagan P 0.1% : Instill one drop in right eye eyes twice daily 

- continue  Latanoprost 0.005 % eye drops : Instill one drop in both eyes at bedtime

- Continue  Timolol 0.5% :  Instill one drop in right eyes twice daily "by mouth Daily., Disp: 90 tablet, Rfl: 1    hydrOXYzine (ATARAX) 25 MG tablet, Take 1 or 2 as needed for anxiety, Disp: 90 tablet, Rfl: 1    omeprazole (priLOSEC) 40 MG capsule, Take 1 capsule by mouth Daily., Disp: 30 capsule, Rfl: 3    cephalexin (Keflex) 500 MG capsule, Take 1 capsule by mouth 3 (Three) Times a Day., Disp: 30 capsule, Rfl: 0    Allergies:   Allergies   Allergen Reactions    Acorus Other (See Comments)     Wheezing    Other Hives     Outdoors, fresh cut grass       Objective     Physical Exam:  Vital Signs:   Vitals:    01/31/24 1131   BP: 130/70   BP Location: Right arm   Patient Position: Sitting   Cuff Size: Large Adult   Pulse: 96   Temp: 98.6 °F (37 °C)   TempSrc: Oral   SpO2: 96%   Weight: 105 kg (232 lb)   Height: 182.9 cm (72\")   PainSc: 0-No pain     Body mass index is 31.46 kg/m².     Physical Exam  Vitals and nursing note reviewed.   Constitutional:       Appearance: Normal appearance.   HENT:      Head: Normocephalic and atraumatic.      Right Ear: Tympanic membrane and ear canal normal.      Ears:      Comments: Left TM is slightly injected and a little fluid behind the tympanic membrane    The left external canal down at the 4:00 to 7 o'clock position does have a tender cyst there I suspect is like little acne bump in the external canal that his wife poked with a needle and got inflamed     Nose: Nose normal.      Mouth/Throat:      Mouth: Mucous membranes are moist.      Pharynx: Oropharynx is clear. No posterior oropharyngeal erythema.   Cardiovascular:      Rate and Rhythm: Normal rate and regular rhythm.   Pulmonary:      Effort: Pulmonary effort is normal.      Breath sounds: Normal breath sounds.   Musculoskeletal:      Cervical back: Normal range of motion and neck supple. No rigidity or tenderness.      Right lower leg: No edema.      Left lower leg: No edema.   Lymphadenopathy:      Cervical: No cervical adenopathy.   Skin:     General: Skin is warm and dry.   Neurological: "      Mental Status: He is alert.   Psychiatric:         Mood and Affect: Mood normal.         Behavior: Behavior normal.         Procedures    PHQ-9 Total Score:       Assessment / Plan      Assessment/Plan:   Diagnoses and all orders for this visit:    1. Sore throat (Primary)  -     POCT rapid strep A  -     cephalexin (Keflex) 500 MG capsule; Take 1 capsule by mouth 3 (Three) Times a Day.  Dispense: 30 capsule; Refill: 0    2. Allergic rhinitis, unspecified seasonality, unspecified trigger    3. Acne cystica         Strep screen is negative    Will go ahead and cover with Keflex as he does have I believe an infected cyst in the left auditory canal and this will cover for strep as well as otitis media also.  I instructed him to leave the ear alone and not to stick anything in it.    He may use little Chloraseptic spray or gargles for his throat Tylenol as needed if not better he will return  BMI is >= 30 and <35. (Class 1 Obesity). The following options were offered after discussion;: exercise counseling/recommendations and nutrition counseling/recommendations      Follow Up:   Return if symptoms worsen or fail to improve.        Adeel Ramirez MD  [facility]   Portions of note created with Dragon voice recognition technology

## 2024-04-12 ENCOUNTER — TELEPHONE (OUTPATIENT)
Dept: FAMILY MEDICINE CLINIC | Facility: CLINIC | Age: 24
End: 2024-04-12

## 2024-04-12 NOTE — TELEPHONE ENCOUNTER
HUB TO RELAY       PT IS ON OUR SCHEDULE TO HAVE A COVID TEST TO GO BACK TO WORK. HOWEVER , HE SHOULD STILL BE IN QUARANTINE FOR ANOTHER DAY. PLEASE HAVE HIM RESCHEDULE FOR NEXT WEEK OR COME INTO THE WALK IN CLINIC TOMORROW TO GET TESTED.

## 2024-07-13 ENCOUNTER — OFFICE VISIT (OUTPATIENT)
Dept: FAMILY MEDICINE CLINIC | Facility: CLINIC | Age: 24
End: 2024-07-13
Payer: COMMERCIAL

## 2024-07-13 VITALS
BODY MASS INDEX: 30.43 KG/M2 | SYSTOLIC BLOOD PRESSURE: 132 MMHG | TEMPERATURE: 97.8 F | DIASTOLIC BLOOD PRESSURE: 88 MMHG | WEIGHT: 224.7 LBS | HEIGHT: 72 IN | OXYGEN SATURATION: 98 % | HEART RATE: 86 BPM

## 2024-07-13 DIAGNOSIS — J22 LOWER RESPIRATORY INFECTION: ICD-10-CM

## 2024-07-13 DIAGNOSIS — J45.21 INTERMITTENT ASTHMA WITH ACUTE EXACERBATION, UNSPECIFIED ASTHMA SEVERITY: Primary | ICD-10-CM

## 2024-07-13 DIAGNOSIS — J45.30 MILD PERSISTENT ASTHMA WITHOUT COMPLICATION: ICD-10-CM

## 2024-07-13 PROCEDURE — 99214 OFFICE O/P EST MOD 30 MIN: CPT | Performed by: FAMILY MEDICINE

## 2024-07-13 RX ORDER — PREDNISONE 10 MG/1
TABLET ORAL
Qty: 21 TABLET | Refills: 0 | Status: SHIPPED | OUTPATIENT
Start: 2024-07-13

## 2024-07-13 RX ORDER — DEXTROMETHORPHAN HYDROBROMIDE AND PROMETHAZINE HYDROCHLORIDE 15; 6.25 MG/5ML; MG/5ML
5 SYRUP ORAL NIGHTLY PRN
Qty: 120 ML | Refills: 0 | Status: SHIPPED | OUTPATIENT
Start: 2024-07-13

## 2024-07-13 RX ORDER — ALBUTEROL SULFATE 90 UG/1
2 AEROSOL, METERED RESPIRATORY (INHALATION) EVERY 4 HOURS PRN
Qty: 10.8 G | Refills: 1 | Status: SHIPPED | OUTPATIENT
Start: 2024-07-13

## 2024-07-13 RX ORDER — BUDESONIDE AND FORMOTEROL FUMARATE DIHYDRATE 160; 4.5 UG/1; UG/1
2 AEROSOL RESPIRATORY (INHALATION)
Qty: 30.6 G | Refills: 3 | Status: SHIPPED | OUTPATIENT
Start: 2024-07-13

## 2024-07-13 RX ORDER — AZITHROMYCIN 500 MG/1
500 TABLET, FILM COATED ORAL DAILY
Qty: 5 TABLET | Refills: 0 | Status: SHIPPED | OUTPATIENT
Start: 2024-07-13

## 2024-07-13 NOTE — PROGRESS NOTES
Follow Up Office Visit      Patient Name: Evaristo King  : 2000   MRN: [id number]     Chief Complaint:    Chief Complaint   Patient presents with    Shortness of Breath     Patient reports he's had shortness of breath and cough x 2 weeks        History of Present Illness: Evaristo King is a 23 y.o. male who is here today for follow up with patient has had a nagging cough for 2 weeks.  He coughs up clear sputum.  No fever.  He does have some sinus drainage.    Subjective      Review of Systems:   Review of Systems   Constitutional:  Negative for fever.   HENT:  Positive for congestion and postnasal drip.    Respiratory:  Positive for cough and shortness of breath.        The following portions of the patient's history were reviewed and updated as appropriate: allergies, current medications, past family history, past medical history, past social history, past surgical history and problem list.    Medications:     Current Outpatient Medications:     albuterol sulfate  (90 Base) MCG/ACT inhaler, Inhale 2 puffs Every 4 (Four) Hours As Needed for Wheezing., Disp: 10.8 g, Rfl: 1    budesonide-formoterol (SYMBICORT) 160-4.5 MCG/ACT inhaler, Inhale 2 puffs 2 (Two) Times a Day., Disp: 30.6 g, Rfl: 3    azithromycin (ZITHROMAX) 500 MG tablet, Take 1 tablet by mouth Daily., Disp: 5 tablet, Rfl: 0    predniSONE (DELTASONE) 10 MG (21) dose pack, Use as directed on package, Disp: 21 tablet, Rfl: 0    promethazine-dextromethorphan (PROMETHAZINE-DM) 6.25-15 MG/5ML syrup, Take 5 mL by mouth At Night As Needed for Cough., Disp: 120 mL, Rfl: 0    Allergies:   Allergies   Allergen Reactions    Acorus Other (See Comments)     Wheezing    Other Hives     Outdoors, fresh cut grass       Objective     Physical Exam:  Vital Signs:   Vitals:    24 0908   BP: 132/88   BP Location: Left arm   Patient Position: Sitting   Cuff Size: Adult   Pulse: 86   Temp: 97.8 °F (36.6 °C)   TempSrc: Oral   SpO2: 98%   Weight: 102  "kg (224 lb 11.2 oz)   Height: 182.9 cm (72\")     Body mass index is 30.47 kg/m².   Facility age limit for growth %emanuel is 20 years.    Physical Exam  Vitals and nursing note reviewed.   Constitutional:       General: He is not in acute distress.     Appearance: Normal appearance. He is not ill-appearing or toxic-appearing.   HENT:      Head: Normocephalic and atraumatic.      Mouth/Throat:      Pharynx: Oropharynx is clear.   Eyes:      Extraocular Movements: Extraocular movements intact.      Pupils: Pupils are equal, round, and reactive to light.   Cardiovascular:      Rate and Rhythm: Normal rate and regular rhythm.   Pulmonary:      Effort: Pulmonary effort is normal.      Breath sounds: Decreased air movement present. Decreased breath sounds present. No wheezing.   Neurological:      Mental Status: He is alert.         Procedures    PHQ-9 Total Score:       Assessment / Plan      Assessment/Plan:   Assessment & Plan  Intermittent asthma with acute exacerbation, unspecified asthma severity  Will refill albuterol and restart Symbicort.  Will give prednisone taper pack.        Mild persistent asthma without complication          Lower respiratory infection  Will cover for atypicals with azithromycin.  Follow-up if symptoms persist or worsen.     New Medications Ordered This Visit   Medications    albuterol sulfate  (90 Base) MCG/ACT inhaler     Sig: Inhale 2 puffs Every 4 (Four) Hours As Needed for Wheezing.     Dispense:  10.8 g     Refill:  1    budesonide-formoterol (SYMBICORT) 160-4.5 MCG/ACT inhaler     Sig: Inhale 2 puffs 2 (Two) Times a Day.     Dispense:  30.6 g     Refill:  3    azithromycin (ZITHROMAX) 500 MG tablet     Sig: Take 1 tablet by mouth Daily.     Dispense:  5 tablet     Refill:  0    predniSONE (DELTASONE) 10 MG (21) dose pack     Sig: Use as directed on package     Dispense:  21 tablet     Refill:  0    promethazine-dextromethorphan (PROMETHAZINE-DM) 6.25-15 MG/5ML syrup     Sig: Take " 5 mL by mouth At Night As Needed for Cough.     Dispense:  120 mL     Refill:  0                   Follow Up:   No follow-ups on file.      JAVON Patiño MD  [facility]

## 2024-07-16 ENCOUNTER — OFFICE VISIT (OUTPATIENT)
Dept: FAMILY MEDICINE CLINIC | Facility: CLINIC | Age: 24
End: 2024-07-16
Payer: COMMERCIAL

## 2024-07-16 VITALS
HEIGHT: 72 IN | BODY MASS INDEX: 30.57 KG/M2 | WEIGHT: 225.7 LBS | OXYGEN SATURATION: 98 % | SYSTOLIC BLOOD PRESSURE: 122 MMHG | DIASTOLIC BLOOD PRESSURE: 86 MMHG | HEART RATE: 89 BPM

## 2024-07-16 DIAGNOSIS — J45.30 MILD PERSISTENT ASTHMA WITHOUT COMPLICATION: Primary | ICD-10-CM

## 2024-07-16 PROCEDURE — 99213 OFFICE O/P EST LOW 20 MIN: CPT | Performed by: FAMILY MEDICINE

## 2024-07-16 NOTE — PROGRESS NOTES
Follow Up Office Visit      Patient Name: Evaristo King  : 2000   MRN: [id number]     Chief Complaint:    Chief Complaint   Patient presents with    Cough     2 weeks, becomes worse when patient is sitting or laying down. Patient states he hasn't been able to sleep good because of this. He is also coughing up sputum, clear    Shortness of Breath     2 weeks, becomes worse when patient is sitting or laying down. Patient states he hasn't been able to sleep good because of this. He is also coughing up sputum, clear       History of Present Illness: Evaristo King is a 23 y.o. male who is here today to   follow-up on asthma said he had an asthma attack and had this appointment scheduled but then got worse so he went to the Saturday walk-in clinic over the [place] office and got placed on Symbicort which he said we gave him last time and really helped but he had not used it and he said he could not afford the Singulair as it was going to cost him over 100%.  So he never bought that medicine    He said he gets coughing fits especially when he sleeps and coughs up some white to clear sputum no fever    Currently he is on Z-Ru prednisone taper albuterol inhaler and has some cough syrup at nighttime    He relates that he has had GERD in the past and just has to watch the spicy things and certain foods and he said when they were having that evaluation they did do an echo of his heart and all that was normal and his heart evaluation was negative and told him it was just reflux    He stopped smoking in the fall continues to remain off of cigarettes and is doing a good job there.    Has had no chest pains no exertional chest pain but does have some shortness of breath and wheezing and cough at times.    No sore throat no sinus pressure no earache    Subjective      Review of Systems:   Review of Systems    Past Medical History:   Past Medical History:   Diagnosis Date    Asthma        Past Surgical History: History  "reviewed. No pertinent surgical history.    Family History:   Family History   Problem Relation Age of Onset    Asthma Mother     Polycystic kidney disease Maternal Grandfather        Social History:   Social History     Socioeconomic History    Marital status: Single   Tobacco Use    Smoking status: Former     Current packs/day: 0.00     Average packs/day: 0.3 packs/day for 5.0 years (1.3 ttl pk-yrs)     Types: Cigarettes     Start date:      Quit date:      Years since quittin.5    Smokeless tobacco: Never   Vaping Use    Vaping status: Former    Substances: Nicotine, Flavoring    Devices: Disposable, Pre-filled pod    Passive vaping exposure: Yes   Substance and Sexual Activity    Alcohol use: Not Currently    Drug use: Never    Sexual activity: Yes     Partners: Female       Medications:     Current Outpatient Medications:     albuterol sulfate  (90 Base) MCG/ACT inhaler, Inhale 2 puffs Every 4 (Four) Hours As Needed for Wheezing., Disp: 10.8 g, Rfl: 1    azithromycin (ZITHROMAX) 500 MG tablet, Take 1 tablet by mouth Daily., Disp: 5 tablet, Rfl: 0    predniSONE (DELTASONE) 10 MG (21) dose pack, Use as directed on package, Disp: 21 tablet, Rfl: 0    promethazine-dextromethorphan (PROMETHAZINE-DM) 6.25-15 MG/5ML syrup, Take 5 mL by mouth At Night As Needed for Cough., Disp: 120 mL, Rfl: 0    budesonide-formoterol (SYMBICORT) 160-4.5 MCG/ACT inhaler, Inhale 2 puffs 2 (Two) Times a Day. (Patient not taking: Reported on 2024), Disp: 30.6 g, Rfl: 3    Allergies:   Allergies   Allergen Reactions    Acorus Other (See Comments)     Wheezing    Other Hives     Outdoors, fresh cut grass       Objective     Physical Exam:  Vital Signs:   Vitals:    24 1000   BP: 122/86   BP Location: Left arm   Patient Position: Sitting   Cuff Size: Adult   Pulse: 89   SpO2: 98%   Weight: 102 kg (225 lb 11.2 oz)   Height: 182.9 cm (72.01\")     Facility age limit for growth %emaneul is 20 years.  Body mass index is " 30.6 kg/m².     Physical Exam  Vitals and nursing note reviewed.   Constitutional:       Appearance: Normal appearance.   HENT:      Head: Normocephalic and atraumatic.      Right Ear: Tympanic membrane and ear canal normal.      Left Ear: Tympanic membrane and ear canal normal.      Mouth/Throat:      Mouth: Mucous membranes are moist.      Pharynx: Oropharynx is clear. No posterior oropharyngeal erythema.   Eyes:      Conjunctiva/sclera: Conjunctivae normal.   Cardiovascular:      Rate and Rhythm: Normal rate and regular rhythm.   Pulmonary:      Effort: Pulmonary effort is normal.      Breath sounds: Normal breath sounds.   Abdominal:      Palpations: Abdomen is soft.      Tenderness: There is no abdominal tenderness.   Musculoskeletal:      Cervical back: Normal range of motion and neck supple. No rigidity or tenderness.      Right lower leg: No edema.      Left lower leg: No edema.   Lymphadenopathy:      Cervical: No cervical adenopathy.   Skin:     General: Skin is warm and dry.   Neurological:      Mental Status: He is alert.   Psychiatric:         Mood and Affect: Mood normal.         Behavior: Behavior normal.         Procedures    PHQ-9 Total Score:       Assessment / Plan      Assessment/Plan:   Diagnoses and all orders for this visit:    1. Mild persistent asthma without complication (Primary)         Encouraged him to get on the Symbicort hand  that prescription as directed    He may continue with his other meds as directed prednisone taper Phenergan DM albuterol inhaler and Z-Ru    Any worsening or any problems let us know otherwise we will follow-up in 3 to 4 months for annual physical and will do blood work at that point         Follow Up:   Return in about 15 weeks (around 10/29/2024) for Annual physical.        Adeel Ramirez MD  [facility]   Portions of note created with Dragon voice recognition technology

## 2024-10-01 ENCOUNTER — OFFICE VISIT (OUTPATIENT)
Dept: FAMILY MEDICINE CLINIC | Facility: CLINIC | Age: 24
End: 2024-10-01
Payer: COMMERCIAL

## 2024-10-01 VITALS
OXYGEN SATURATION: 99 % | WEIGHT: 230 LBS | HEIGHT: 72 IN | SYSTOLIC BLOOD PRESSURE: 118 MMHG | DIASTOLIC BLOOD PRESSURE: 80 MMHG | HEART RATE: 87 BPM | RESPIRATION RATE: 15 BRPM | BODY MASS INDEX: 31.15 KG/M2

## 2024-10-01 DIAGNOSIS — L25.5 DERMATITIS DUE TO PLANT: Primary | ICD-10-CM

## 2024-10-01 PROCEDURE — 99213 OFFICE O/P EST LOW 20 MIN: CPT | Performed by: PHYSICIAN ASSISTANT

## 2024-10-01 RX ORDER — PREDNISONE 20 MG/1
TABLET ORAL
Qty: 24 TABLET | Refills: 0 | Status: SHIPPED | OUTPATIENT
Start: 2024-10-01 | End: 2024-10-13

## 2024-10-02 NOTE — PROGRESS NOTES
"      Patient Office Visit      Patient Name: Evaristo King  : 2000   MRN: [id number]     Chief Complaint:    Chief Complaint   Patient presents with    Poison Ivy       History of Present Illness: Evaristo King is a 23 y.o. male who is here today complaining of rash due to poison ivy that is spreading all over his body.  Rash started 4 to 5 days ago, exposed to poison ivy about a week ago.  Has been using topical corticosteroid and another over-the-counter topical poison ivy remedy without any relief.    Subjective      Review of Systems:         Past Medical History:   Past Medical History:   Diagnosis Date    Asthma        Past Surgical History: History reviewed. No pertinent surgical history.    Family History:   Family History   Problem Relation Age of Onset    Asthma Mother     Polycystic kidney disease Maternal Grandfather        Social History:   Social History     Socioeconomic History    Marital status: Single   Tobacco Use    Smoking status: Former     Current packs/day: 0.00     Average packs/day: 0.3 packs/day for 5.0 years (1.3 ttl pk-yrs)     Types: Cigarettes     Start date:      Quit date:      Years since quittin.7    Smokeless tobacco: Never   Vaping Use    Vaping status: Former    Substances: Nicotine, Flavoring    Devices: Disposable, Pre-filled pod    Passive vaping exposure: Yes   Substance and Sexual Activity    Alcohol use: Not Currently    Drug use: Never    Sexual activity: Yes     Partners: Female       Allergies:   Allergies   Allergen Reactions    Acorus Other (See Comments)     Wheezing    Other Hives     Outdoors, fresh cut grass       Objective     Physical Exam:  Vital Signs:   Vitals:    10/01/24 1528   BP: 118/80   BP Location: Right arm   Patient Position: Sitting   Cuff Size: Adult   Pulse: 87   Resp: 15   SpO2: 99%   Weight: 104 kg (230 lb)   Height: 182.9 cm (72\")     Body mass index is 31.19 kg/m².           Physical Exam  Constitutional:       " General: He is not in acute distress.     Appearance: Normal appearance.   Skin:     Comments: Scattered maculopapular, streaky rash trunk and extremities.   Neurological:      Mental Status: He is alert.   Psychiatric:         Mood and Affect: Mood normal.         Behavior: Behavior normal.         Thought Content: Thought content normal.         Judgment: Judgment normal.         Procedures    Assessment / Plan      Assessment/Plan:   Diagnoses and all orders for this visit:    1. Dermatitis due to plant (Primary)  -     predniSONE (DELTASONE) 20 MG tablet; Take 3 tablets by mouth Daily for 4 days, THEN 2 tablets Daily for 4 days, THEN 1 tablet Daily for 4 days.  Dispense: 24 tablet; Refill: 0       Prednisone taper.    Medications:     Current Outpatient Medications:     albuterol sulfate  (90 Base) MCG/ACT inhaler, Inhale 2 puffs Every 4 (Four) Hours As Needed for Wheezing., Disp: 10.8 g, Rfl: 1    budesonide-formoterol (SYMBICORT) 160-4.5 MCG/ACT inhaler, Inhale 2 puffs 2 (Two) Times a Day., Disp: 30.6 g, Rfl: 3    predniSONE (DELTASONE) 20 MG tablet, Take 3 tablets by mouth Daily for 4 days, THEN 2 tablets Daily for 4 days, THEN 1 tablet Daily for 4 days., Disp: 24 tablet, Rfl: 0        Follow Up:   No follow-ups on file.    Dayanara Jarquin PA-C   [facility]     NOTE TO PATIENT: The 21st Century Cures Act makes medical notes like these available to patients in the interest of transparency. However, be advised this is a medical document. It is intended as peer to peer communication. It is written in medical language and may contain abbreviations or verbiage that are unfamiliar. It may appear blunt or direct. Medical documents are intended to carry relevant information, facts as evident, and the clinical opinion of the practitioner.

## 2025-03-21 DIAGNOSIS — J45.30 MILD PERSISTENT ASTHMA WITHOUT COMPLICATION: ICD-10-CM

## 2025-03-24 RX ORDER — BUDESONIDE AND FORMOTEROL FUMARATE 160; 4.5 UG/1; UG/1
2 AEROSOL, METERED RESPIRATORY (INHALATION) 2 TIMES DAILY
Qty: 30.6 G | Refills: 3 | OUTPATIENT
Start: 2025-03-24